# Patient Record
Sex: FEMALE | Race: OTHER
[De-identification: names, ages, dates, MRNs, and addresses within clinical notes are randomized per-mention and may not be internally consistent; named-entity substitution may affect disease eponyms.]

---

## 2020-01-10 ENCOUNTER — HOSPITAL ENCOUNTER (EMERGENCY)
Dept: HOSPITAL 74 - JER | Age: 45
LOS: 1 days | Discharge: HOME | End: 2020-01-11
Payer: COMMERCIAL

## 2020-01-10 VITALS — TEMPERATURE: 98.1 F

## 2020-01-10 VITALS — BODY MASS INDEX: 23 KG/M2

## 2020-01-10 DIAGNOSIS — N92.0: Primary | ICD-10-CM

## 2020-01-11 VITALS — DIASTOLIC BLOOD PRESSURE: 62 MMHG | SYSTOLIC BLOOD PRESSURE: 117 MMHG | HEART RATE: 80 BPM

## 2020-01-11 LAB
ALBUMIN SERPL-MCNC: 3.6 G/DL (ref 3.4–5)
ALP SERPL-CCNC: 69 U/L (ref 45–117)
ALT SERPL-CCNC: 17 U/L (ref 13–61)
ANION GAP SERPL CALC-SCNC: 6 MMOL/L (ref 8–16)
AST SERPL-CCNC: 15 U/L (ref 15–37)
BASOPHILS # BLD: 1 % (ref 0–2)
BILIRUB SERPL-MCNC: 0.2 MG/DL (ref 0.2–1)
BUN SERPL-MCNC: 22.2 MG/DL (ref 7–18)
CALCIUM SERPL-MCNC: 9.2 MG/DL (ref 8.5–10.1)
CHLORIDE SERPL-SCNC: 105 MMOL/L (ref 98–107)
CO2 SERPL-SCNC: 27 MMOL/L (ref 21–32)
CREAT SERPL-MCNC: 0.8 MG/DL (ref 0.55–1.3)
DEPRECATED RDW RBC AUTO: 16.4 % (ref 11.6–15.6)
EOSINOPHIL # BLD: 1.9 % (ref 0–4.5)
GLUCOSE SERPL-MCNC: 88 MG/DL (ref 74–106)
HCT VFR BLD CALC: 34.5 % (ref 32.4–45.2)
HGB BLD-MCNC: 11.1 GM/DL (ref 10.7–15.3)
LYMPHOCYTES # BLD: 34.8 % (ref 8–40)
MCH RBC QN AUTO: 28.4 PG (ref 25.7–33.7)
MCHC RBC AUTO-ENTMCNC: 32.3 G/DL (ref 32–36)
MCV RBC: 87.7 FL (ref 80–96)
MONOCYTES # BLD AUTO: 11.7 % (ref 3.8–10.2)
NEUTROPHILS # BLD: 50.6 % (ref 42.8–82.8)
PLATELET # BLD AUTO: 229 K/MM3 (ref 134–434)
PMV BLD: 8.8 FL (ref 7.5–11.1)
POTASSIUM SERPLBLD-SCNC: 3.7 MMOL/L (ref 3.5–5.1)
PROT SERPL-MCNC: 7.4 G/DL (ref 6.4–8.2)
RBC # BLD AUTO: 3.93 M/MM3 (ref 3.6–5.2)
SODIUM SERPL-SCNC: 139 MMOL/L (ref 136–145)
WBC # BLD AUTO: 5.3 K/MM3 (ref 4–10)

## 2020-01-11 NOTE — PDOC
History of Present Illness





- General


History Source: Patient


Exam Limitations: Clinical Condition





- History of Present Illness


Initial Comments: 





01/11/20 01:23


Patient with history of menorrhagia present with complaint of heavy menstrual 

bleeding since yesterday soaking 6 pads a day which has lightened up today.  

Patient reporting she feels dehydrated and feels she might have anemia.  Denies 

chest pain, shortness of breath, palpitation, dizziness, weakness.  Denies any 

other symptoms. LMP began yesterday


Is this a multiple visit Asthma Patient?: No


Timing/Duration: 24 hours





<Demetrio Villarreales - Last Filed: 01/11/20 01:23>





<Ludin Landers - Last Filed: 01/11/20 02:33>





- General


Chief Complaint: Weakness


Stated Complaint: WEEKNESS


Time Seen by Provider: 01/10/20 23:32





Past History





- Past Medical History


COPD: No


GI Disorders: Yes (ACID REFLUX)





- Immunization History


Td Vaccination: Yes


Immunization Up to Date: Yes





- Psycho Social/Smoking Cessation Hx


Smoking Status: No


Smoking History: Never smoked


Years of Tobacco Use: 0


Have you smoked in the past 12 months: No


Number of Cigarettes Smoked Daily: 0


Cigars Per Day: 0


Hx Alcohol Use: No


Drug/Substance Use Hx: No


Substance Use Type: None





<Demetrio Villarreal Ben - Last Filed: 01/11/20 01:23>





<Ludin Landers - Last Filed: 01/11/20 02:33>





- Past Medical History


Allergies/Adverse Reactions: 


 Allergies











Allergy/AdvReac Type Severity Reaction Status Date / Time


 


No Known Allergies Allergy   Verified 01/10/20 22:17











Home Medications: 


Ambulatory Orders





Omeprazole Magnesium [Prilosec] 80 mg PO DAILY 12/20/16 











**Review of Systems





- Review of Systems


Able to Perform ROS?: Yes


Is the patient limited English proficient: No


Constitutional: No: Chills, Fever, Malaise


HEENTM: No: Symptoms Reported, See HPI, Eye Pain, Blurred Vision, Tearing, 

Recent change in vision, Double Vision, Cataracts, Ear Pain, Ocular Prothesis, 

Ear Discharge, Nose Pain, Nose Congestion, Tinnitus, Nose Bleeding, Hearing Loss

, Throat Pain, Throat Swelling, Mouth Pain, Dental Problems, Difficulty 

Swallowing, Mouth Swelling, Other


Respiratory: No: Symptoms reported, See HPI, Cough, Orthopnea, Shortness of 

Breath, SOB with Exertion, SOB at Rest, Stridor, Wheezing, Productive cough, 

Hemoptysis, Other


Cardiac (ROS): No: Symptoms Reported, See HPI, Chest Pain, Edema, Irregular 

Heart Rate, Lightheadedness, Palpitations, Syncope, Chest Tightness, Other


ABD/GI: No: Symptoms Reported, Constipated, Diarrhea, Nausea, Vomiting, 

Abdominal cramping


: No: Symptoms Reported, Burning, Dysuria, Discharge, Frequency, Hematuria, 

Urgency


Musculoskeletal: No: Symptoms Reported


Neurological: No: Symptoms reported, Headache, Weakness, Dizziness


All Other Systems: Reviewed and Negative





<Demetrio Villarreal - Last Filed: 01/11/20 01:23>





*Physical Exam





- Vital Signs


 Last Vital Signs











Temp Pulse Resp BP Pulse Ox


 


 98.1 F   84   18   114/57 L  100 


 


 01/10/20 22:14  01/10/20 22:14  01/10/20 22:14  01/10/20 22:14  01/10/20 22:14














- Physical Exam





01/11/20 01:26


GENERAL:


Well developed, well nourished. Awake and alert. No acute distress.


HEENT:  Normocephalic, atraumatic. PERRLA, EOMI. No conjunctival pallor. Sclera 

are non-icteric. Moist mucous membranes. Oropharynx is clear.


NECK: 


Supple. Full ROM. 


CARDIOVASCULAR:


Regular rate and rhythm. No murmurs, rubs, or gallops. Distal pulses are 2+ and 

symmetric. 


PULMONARY: 


No evidence of respiratory distress. Lungs clear to auscultation bilaterally. 

No wheezing, rales or rhonchi.


ABDOMINAL:


Soft. Non-tender. Non-distended. No rebound or guarding. No organomegaly. 

Normoactive bowel sounds. 


MUSCULOSKELETAL 


Normal range of motion at all joints. 


SKIN: 


Warm and dry. Normal capillary refill. No rashes. No jaundice.  No cyanosis


NEUROLOGICAL: 


Alert, awake, appropriate.  Gait is normal without ataxia.


PSYCHIATRIC: 


Cooperative. Good eye contact. Appropriate mood


General Appearance: Yes: Nourished, Appropriately Dressed.  No: Apparent 

Distress





<Demetrio Villarreal - Last Filed: 01/11/20 01:23>





- Vital Signs


 Last Vital Signs











Temp Pulse Resp BP Pulse Ox


 


 98.1 F   80   17   117/62   100 


 


 01/10/20 22:14  01/11/20 01:58  01/11/20 01:58  01/11/20 01:58  01/11/20 01:58














<Ludin Landers - Last Filed: 01/11/20 02:33>





ED Treatment Course





- LABORATORY


CBC & Chemistry Diagram: 


 01/11/20 00:00





 01/11/20 00:00





- ADDITIONAL ORDERS


Additional order review: 


 Laboratory  Results











  01/11/20





  00:00


 


Sodium  139


 


Potassium  3.7


 


Chloride  105


 


Carbon Dioxide  27


 


Anion Gap  6 L


 


BUN  22.2 H


 


Creatinine  0.8


 


Est GFR (CKD-EPI)AfAm  103.92


 


Est GFR (CKD-EPI)NonAf  89.66


 


Random Glucose  88


 


Calcium  9.2


 


Total Bilirubin  0.2


 


AST  15


 


ALT  17


 


Alkaline Phosphatase  69


 


Total Protein  7.4


 


Albumin  3.6








 











  01/11/20





  00:00


 


RBC  3.93


 


MCV  87.7


 


MCHC  32.3


 


RDW  16.4 H


 


MPV  8.8


 


Neutrophils %  50.6


 


Lymphocytes %  34.8


 


Monocytes %  11.7 H


 


Eosinophils %  1.9


 


Basophils %  1.0














- Medications


Given in the ED: 


ED Medications














Discontinued Medications














Generic Name Dose Route Start Last Admin





  Trade Name Freq  PRN Reason Stop Dose Admin


 


Lactated Ringer's  1,000 ml  01/10/20 23:34  01/11/20 00:12





  Lactated Ringers Solution  IV  01/10/20 23:35  1,000 ml





  ONCE ONE   Administration





     





     





     





     














<CherelleDemetrio Ben - Last Filed: 01/11/20 01:23>





- LABORATORY


CBC & Chemistry Diagram: 


 01/11/20 00:00





 01/11/20 00:00





- ADDITIONAL ORDERS


Additional order review: 


 Laboratory  Results











  01/11/20 01/11/20





  00:00 00:00


 


Sodium   139


 


Potassium   3.7


 


Chloride   105


 


Carbon Dioxide   27


 


Anion Gap   6 L


 


BUN   22.2 H


 


Creatinine   0.8


 


Est GFR (CKD-EPI)AfAm   103.92


 


Est GFR (CKD-EPI)NonAf   89.66


 


Random Glucose   88


 


Calcium   9.2


 


Total Bilirubin   0.2


 


AST   15


 


ALT   17


 


Alkaline Phosphatase   69


 


Total Protein   7.4


 


Albumin   3.6


 


Serum Pregnancy, Qual  Negative 








 











  01/11/20





  00:00


 


RBC  3.93


 


MCV  87.7


 


MCHC  32.3


 


RDW  16.4 H


 


MPV  8.8


 


Neutrophils %  50.6


 


Lymphocytes %  34.8


 


Monocytes %  11.7 H


 


Eosinophils %  1.9


 


Basophils %  1.0














- Medications


Given in the ED: 


ED Medications














Discontinued Medications














Generic Name Dose Route Start Last Admin





  Trade Name Debra  PRN Reason Stop Dose Admin


 


Lactated Ringer's  1,000 ml  01/10/20 23:34  01/11/20 00:12





  Lactated Ringers Solution  IV  01/10/20 23:35  1,000 ml





  ONCE ONE   Administration





     





     





     





     














<Ludin Landers - Last Filed: 01/11/20 02:33>





Medical Decision Making





- Medical Decision Making





01/11/20 01:25


Patient with history of menorrhagia present with complaint of heavy menstrual 

bleeding since yesterday soaking 6 pads a day which has lightened up today.  

Patient reporting she feels dehydrated and feels she might have anemia.  Denies 

chest pain, shortness of breath, palpitation, dizziness, weakness.  Denies any 

other symptoms. LMP began yesterday


Clinical exam unremarkable with normal exam.  Patient in no acute distress.  

CBC lab done shows no anemia.  IV hydration on normal saline 1 L given patient.

  Patient reported feeling better now.  Patient stable for discharge with 

advised to follow-up with GYN for menorrhagia management





<Demetrio Villarreal - Last Filed: 01/11/20 01:23>





- Medical Decision Making








The patient was seen and evaluated in conjunction with ZACHERY Villarreal under my 

direct supervision, ancillary studies were reviewed.  I agree with the plan as 

outlined by ZACHERY Villarreal.














<Ludin Landers - Last Filed: 01/11/20 02:33>





Discharge





- Discharge Information


Problems reviewed: Yes





- Admission


No





<Demetrio Villarreal - Last Filed: 01/11/20 01:23>





<Ludin Landers - Last Filed: 01/11/20 02:33>





- Discharge Information


Clinical Impression/Diagnosis: 


Menorrhagia


Qualifiers:


 Menorrahagia type: with regular cycle Qualified Code(s): N92.0 - Excessive and 

frequent menstruation with regular cycle





Condition: Stable


Disposition: HOME





- Follow up/Referral


Referrals: 


Jessie Lala [Primary Care Provider] - 





- Patient Discharge Instructions


Patient Printed Discharge Instructions:  DI for Menorrhagia


Additional Instructions: 


Your blood work was normal and shows no anemia.  Follow-up with your GYN to 

discuss your excessive menstrual bleeding issue.  Come back to emergency room 

if dizziness, blurry vision, shortness of breath, palpitations or weakness





- Post Discharge Activity